# Patient Record
(demographics unavailable — no encounter records)

---

## 2018-02-13 NOTE — MRI
MRI BRAIN WITHOUT IV CONTRAST 

2/13/18

 

HISTORY: 

Left sided numbness and slurred speech with onset 2310 hours. Altered mental status. Numbness and tin
gling. Symptoms have now resolved.

 

COMPARISON:  

11/17/14.

 

FINDINGS:  

There is an area of encephalomalacia and volume loss involving the medial aspect of the right cerebra
l peduncle related to remote area of infarction. There are scattered punctate patchy areas of increas
ed FLAIR and T2 weighted signal intensity in the periventricular white matter which are nonspecific b
ut likely reflective of chronic small vessel ischemic changes. Similar findings seen in the brenda. Fin
dings were also present on the prior exam. There is no evidence of an acute infarction on today's exa
mination. There is diffuse cerebral volume loss. Ventricular system is normal in size, shape and posi
tion for the degree of sulcal atrophy. Appropriate flow voids are demonstrated at the base of the bra
in. 

 

The native lens on the left is not seen. However, the orbits, paranasal sinuses, and skull base are o
therwise within normal limits. There has been no other interval change compared to the prior exam. 

 

IMPRESSION:  

1.      No acute intracranial abnormalities demonstrated. 

2.      Chronic small vessel ischemic changes and cerebral volume loss. 

3.      Remote infarction involving the right cerebellar peduncle. This represented an acute infarcti
on on the prior study in 2014. 

 

POS: [facility]

## 2018-02-13 NOTE — HP
DATE OF ADMISSION:  02/13/2018

 

PRIMARY CARE DOCTOR:  Sunny Gao M.D.

 

PRIMARY CARDIOLOGIST:  Cecil Lyons M.D.

 

CHIEF COMPLAINT:  Stroke-like symptoms.

 

HISTORY OF PRESENT ILLNESS:  Patient is an 82-year-old female with hypertension, history of CVA with 
residual left-sided weakness and percutaneous PFO closure with cribriform plate in the past, presente
d to the emergency room with stroke like symptoms.

 

Around 10:30 p.m., patient noticed sudden onset of left-sided numbness along with slurring of speech.
  The family also noticed some left-sided facial droop.  Her mentation was not significantly altered 
per family.  There was no weakness on the right side reported.  No significant change in the weakness
 on the left side compared to her baseline.  There was no seizure, headache, double vision different 
from baseline reported.  No chest pain or palpitations.  She is compliant with 81 mg aspirin.  Her NI
H in the emergency room was 1.  She received aspirin in the emergency room.

 

PAST MEDICAL HISTORY:

1.  History of cerebrovascular accident with residual left-sided deficits.

2.  Percutaneous PFO closure with cribriform plate.

3.  Hypertension.

4.  Parkinson's disease.

5.  Depression.

6.  History of atrial fibrillation.

7.  History of compression fracture.

8.  Ascending aortic aneurysm.

9.  Hyperlipidemia.

10.  History of lupus.

11.  Senile tremors.

 

PAST SURGICAL HISTORY:

1.  PFO closure.

2.  Hysterectomy.

3.  Subtotal thyroidectomy.

4.  Surgery for left femoral neck fracture.

 

ALLERGIES:  Patient is allergic to LEVAQUIN.

 

CURRENT HOME MEDICATIONS:  Family to get accurate list of medications.

 

SOCIAL HISTORY:  Patient currently lives at home with her daughter.  No smoking, alcohol or drug use.
  She is FULL CODE.  Makes her own decision with the help of her family.  Ambulates with help of a wa
lker.

 

FAMILY HISTORY:  Negative for premature coronary artery disease.

 

REVIEW OF SYSTEMS:  Cannot be reliably obtained from the patient.  Patient is a poor historian.

 

PHYSICAL EXAMINATION:

VITAL SIGNS:  On ER arrival, temperature 98.2, respirations 20, pulse 61, blood pressure 179/116 with
 O2 saturation 98% on room air.

GENERAL:  An 82-year-old female in no apparent distress, still complaining of left-sided numbness.

HEENT:  Head is atraumatic, normocephalic.  Sclerae are anicteric.  Moist mucous membrane, no oral le
ean.  Pupils on the left was around 4-5 mm with good response to light.  The right eye is chronicall
y deviated outside.  The pupils were around 6-7 mm with sluggish response to light.  Patient cuco lay has to shield this eye to prevent double vision.

NECK:  Supple, no JVD appreciated.  No carotid bruits.

LUNGS:  Clear to auscultation bilaterally.  No wheezing or rales.

HEART:  S1 and S2 present.  Regular rate and rhythm.  No murmur, rubs, or gallops appreciated.  No he
aves or pulsation.

ABDOMEN:  Soft, nontender, bowel sounds present.

EXTREMITIES:  No edema or calf tenderness.

NEUROLOGIC:  Cranial nerves II-XII was normal on examination except for discussed above.  Power was 3
/5 in the left lower extremity which is unchanged from her baseline.  The power was 5/5 in right uppe
r and right lower extremity.  There was diminished sensation on the left half of the face.  She had d
iminished touch sensation on the left upper and left lower extremities.

PSYCHIATRY:  The patient is alert and awake, poor historian.

SKIN:  Warm and dry.

LYMPH NODES:  No palpable lymph nodes in the neck.

PERIPHERAL VASCULAR:  Radial pulses palpable bilaterally.

MUSCULOSKELETAL:  No joint swelling or tenderness.

 

LABORATORY DATA AND IMAGING DATA:  CBC showed WBC 7.3 with hemoglobin 15, hematocrit 44.9, platelet c
ount of 157.  PT, INR, PTT normal range.  Chemistries showed sodium 137, potassium 4.6, chloride 101,
 bicarbonate 26, BUN 20, creatinine 0.71, glucose of 92.  Cardiac enzymes were normal.  Urinalysis wa
s negative for acute findings.  Chest x-ray by my review was negative for infiltrate.  CT scan of the
 brain by my review was negative for acute CVA.  EKG by my review showed sinus bradycardia with first
 degree AV block.

 

IMPRESSION:

1.  Transient ischemic attack, suspected acute cerebrovascular accident.

2.  History of cerebrovascular accident with residual right-sided deficits, on aspirin.

3.  History of atrial fibrillation, currently in sinus rhythm.

4.  Parkinson's disease.

5.  Hypertension.

6.  Depression.

7.  Hyperlipidemia.

8.  History of patent foramen ovale closure with cribriform plate.

9.  History of compression fracture.

10.  Ascending aortic aneurysm.

11.  Senile tremors.

 

PLAN:  The patient will be monitored in the stroke unit.  We will change aspirin to 325 mg daily.  We
 will consult Neurology as well as Cardiology due to history of atrial fibrillation and PFO closure. 
 We will confirm home medications and start accordingly.  We will consult physical therapy, occupatio
nal therapy, and speech therapy.  NIH per protocol.  We will check fasting lipid profile.  Urine cult
ures have been sent.  She passed dysphagia screen in the emergency room.  Stroke education.  SCDs for
 DVT prophylaxis.  Statins.

 

Plan of care was discussed with the patient and the family at the bedside.  They stated understanding
.

## 2018-02-13 NOTE — CON
DATE OF CONSULTATION:  03/11/2017

 

CONSULTING PHYSICIAN:  [facility].

 

IMPRESSION:

1.  Possible transient small vessel TIA with left-sided tingling.

2.  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

 

PLAN:

1.  MRI of the brain is pending to determine whether new ischemic event occurred.

2.  Consider adding a statin if she comes back positive for new event.

 

HISTORY OF PRESENT ILLNESS:  Ms. De Los Santos is an elderly lady with a past history of a brainstem st
roke, hypertension.  She has some residual left upper extremity spastic weakness.  She began having s
ome tingling on the left side of the body.  She told her caregiver about it.  Her daughter came over 
and subsequently brought her to the hospital.  She had a CT scan in the emergency room, which did not
 show any acute hemorrhage.  She is in normal sinus rhythm.  Her lab work was unremarkable.  Echocard
iogram showed a normal ejection fraction.  Her symptoms have since resolved.  She is having a little 
bit of difficulty determining the duration of her symptoms.

 

PAST MEDICAL HISTORY:  As noted above.

 

ALLERGIES:  LEVAQUIN.

 

SOCIAL HISTORY:  No tobacco or alcohol use.

 

FAMILY HISTORY:  Noncontributory.

 

REVIEW OF SYSTEMS:  Positive for double vision, no complaints of headache, nausea, vomiting, vertigo,
 chest pain.

 

PHYSICAL EXAMINATION:

GENERAL:  She is a well-nourished elderly lady, in no distress.

VITAL SIGNS:  Stable.  She has been afebrile.

HEENT:  Conjunctivae are clear.  Her right eye sticks in a lateralized position.  The left eye respon
ds appropriately.  There is some subtle facial asymmetry present.  Oropharynx is clear.

NECK:  Supple.

EXTREMITIES:  No edema.

NEUROLOGIC:  She is alert and cooperative.  Her speech is fluent and clear.  Cranial nerves were inta
ct other than what noted above.  Motor exam showed spastic weakness in the left upper extremity with 
proximal antigravity strength.  Sensation is intact to touch.  Reflexes were upgoing on the left and 
downgoing on the right.  Gait was not tested, but reportedly she can walk with a rolling walker.  No 
abnormal movements were seen.

 

SUMMARY:  This is an elderly lady with some risk factors for small vessel ischemic event.  Her sympto
ms would suggest a possible thalamic infarction.  MRI is pending and shows evidence of ischemia, go a
head and start her on a statin.

## 2018-02-13 NOTE — RAD
PORTABLE CHEST ONE VIEW:

 

Date: 2-13-18 

Time: 1:17 a.m.

 

History: Altered mental status, slurred speech, numbness on the left side. 

 

FINDINGS: 

Comparison is made with exam of 12-10-17. 

 

The heart size is enlarged. The aorta is tortuous. The lungs are expanded without focal areas of cons
olidation, pneumothorax, karl pleural edema, or pleural effusions. 

 

IMPRESSION: 

No acute process. 

 

POS: [facility]

## 2018-02-13 NOTE — CT
PRELIMINARY REPORT/[facility]/EMERGENCY AFTER

HOURS PROCEDURE:

 

Addendum created by Neal Cooley MD on 2/13/2018 12:47 AM Central Time ([place] & [place])

THIS REPORT CONTAINS FINDINGS THAT MAY BE CRITICAL TO PATIENT CARE. The findings were verbally commun
icated via telephone conference with CHANTEL ALEXANDER at 12:47 AM CST on 2/13/2018. The findings we
re acknowledged and understood.

 

Initial Report created on 2/13/2018 12:39 AM Central Time ([place] & [place])

 

EXAM:

CT Head Without Intravenous Contrast

 

CLINICAL HISTORY:

82 years old, female; Signs and symptoms; Altered mental status/memory loss and speech disturbance an
d weakness, extremity; Slurred speech; Patient HX: Stroke alert. . . . F82, AMS, assisted living, str
lisa symptoms of left sided numbness and slurred speech onset 2310

 

TECHNIQUE:

Axial computed tomography images of the head/brain without intravenous contrast.

 

COMPARISON:

No relevant prior studies available.

 

FINDINGS:

Brain: Moderate volume loss. Chronic right basal ganglia lacunar infarctions

No hemorrhage. Mild white matter disease. No edema.

Ventricles: Unremarkable. No ventriculomegaly.

Bones/joints: Unremarkable. No acute fracture.

Soft tissues: Unremarkable.

Sinuses: Unremarkable as visualized. No acute sinusitis.

Mastoid air cells: Unremarkable as visualized. No mastoid effusion.

 

IMPRESSION:

No intracranial hemorrhage.Please see discussion above.

 

Thank you for allowing us to participate in the care of your patient.

 

Dictated and Authenticated by: Neal Cooley MD

02/13/2018 12:39 AM Central Time ([place] & [place])

 

 

 

                           FINAL REPORT

 

CT BRAIN WITHOUT CONTRAST:

 

I agree with the preliminary report given by Dr. Neal Cooley of [facility]. 

 

POS: [facility]

## 2018-02-14 NOTE — CON
DATE OF CONSULTATION:  02/13/2018

 

REASON FOR CONSULTATION:  Possible stroke or TIA.

 

HISTORY OF PRESENT ILLNESS:  Ms. De Los Santos is a very pleasant 82-year-old black female, very well k
nown to myself, who comes in for evaluation of possible TIA.  She was at home and the daughter who is
 very involved in her care, tells me that she had been getting confused in the last few days.  She ha
s a history of previous stroke that has caused her to have a facial droop and weakness on one side of
 her body.  Because of the confusion, EMS was called and EMS saw her with slurred speech and left-roly
ed numbness and left-sided facial droop, so she was brought in for further evaluation.  Currently, kimberly portillo is still a little bit confused, but much closer to normal.  Her neurological examination is similar
 to what it was before as far as strength is concerned.

 

PAST MEDICAL HISTORY:

1.  History of cerebrovascular accident with residual left-sided deficits.

2.  History of PFO closure with a cribriform Amplatzer occluder device.

3.  Hypertension.

4.  Parkinson disease.

5.  Depression.

6.  Paroxysmal atrial fibrillation.

7.  Compression fractures in the past.

8.  Descending aortic aneurysm.

9.  Hyperlipidemia.

10.  Lupus.

11.  Senile tremors.

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

 

PAST SURGICAL HISTORY:

1.  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

2.  Hysterectomy.

3.  Subtotal thyroidectomy.

3.  Left femoral neck fracture repair.

 

ALLERGIES:  LEVAQUIN.

 

FAMILY HISTORY:  Noncontributory.

 

SOCIAL HISTORY:  No alcohol, tobacco, or drugs.

 

OUTPATIENT MEDICATIONS:  Include,

1.  Lasix 20 mg p.r.n.

2.  Sinemet.

3.  Aspirin 81 a day.

4.  Pristiq.

5.  Aricept.

6.  Docusate.

7.  Hydrochlorothiazide 12.5 mg daily.

8.  Lactobacillus.

9.  Primidone.

10.  Lisinopril 2.5 mg a day.

 

REVIEW OF SYSTEMS:  It is difficult to obtain, as the patient is somewhat confused.

 

PHYSICAL EXAMINATION:

VITAL SIGNS:  Temperature 97.6, pulse 98, respiration rate 18, satting 97% on room air, blood pressur
e 137/100.

GENERAL:  Awake, alert.  She is oriented to person, difficult to place and time, in no distress.

HEENT:  Normocephalic, atraumatic.

NECK:  Supple.

LUNGS:  Clear.

CARDIOVASCULAR:  S1, S2.  No S3 or S4.

ABDOMEN:  Soft, positive bowel sounds.

EXTREMITIES:  No edema.

SKIN:  Warm and dry.

 

LABORATORY WORK:  Reviewed.  CBC is unremarkable.  Coags were normal.  Chemistry is very normal.  Tro
ponins were negative x3.  UA was unremarkable.

 

ASSESSMENT AND PLAN:

1.  Possible transient ischemic attack.  We will await Neurology consultation to see what their thoug
hts as well as results of MRI.

2.  Echocardiogram did show no evidence of shunting in her heart.  Her cribriform plate seems to be w
ell seated without any issues.  If this were to be a transient ischemic attack or stroke, she would b
e a candidate for a left atrial appendage occluder device most likely in the form of a Lariat device.
  Given her PFO closure device in place, Watchman would not be possible.  We will decide pending resu
lts of MRI and consultation with Neurology.

 

Thank you for letting us participate in the care of your patient.  We will follow.

## 2018-02-14 NOTE — PDOC.CTH
Cardiology Progress Note





- Subjective





She is still a little more confused than normal. Her MRI did not show an acute 

stroke. 





- Objective


 Vital Signs











  Temp Pulse Resp BP BP Pulse Ox


 


 02/14/18 16:00  97.8 F  82  18   114/68  97


 


 02/14/18 12:00  98.4 F  83  14   119/72  96


 


 02/14/18 09:28   104 H   141/75 H  


 


 02/14/18 09:26  97.7 F  104 H  14  141/75 H   95


 


 02/14/18 08:00  97.7 F  104 H  16   








 











Weight                         188 lb 14.4 oz














 











 02/13/18 02/14/18 02/15/18





 06:59 06:59 06:59


 


Intake Total  720 


 


Output Total   300


 


Balance  720 -300














- Physical Examination


General/Neuro: NAD


Neck: no JVD present


Lungs: unlabored respirations


Heart: other: (Irreg)


Abdomen: NT/ND


Extremities: + edema B (none)





- Labs


Result Diagrams: 


 02/13/18 00:49





 02/14/18 14:18


 Troponin/CKMB











CK-MB (CK-2)  1.5 ng/mL (0-6.6)   02/13/18  00:49    


 


Troponin I  0.013 ng/mL (< 0.028)   02/13/18  06:52    














- Assessment/Plan





1. AMS


2. Hx of CVA


3. s/p Amplatzer occluder to the interatrial septum. Normal functioning. 





PLAN:


- No changes for now. 


- Statin for Hx of CVA


- No anticoagulation due to high risk of falls.

## 2018-02-15 NOTE — DIS
DATE OF ADMISSION:  02/13/2018

 

DATE OF DISCHARGE:  02/14/2018

 

DISCHARGE DIAGNOSES:  Possible transient ischemic attack, unspecified.

 

DISCHARGE SUMMARY:  The patient is a very pleasant 82-year-old female with history of CVA with left-s
ided residual deficits, percutaneous PFO with closure with cribriform plate, hypertension, Parkinson'
s, depression who presented initially to the hospital with worsening left-sided weakness and possible
 slurred speech.  The patient initially underwent a CT of the head which was negative.  She also unde
rwent an MRI of the brain which did not show any acute process.  The patient was seen by Cardiology a
nd Neurology without any change in her medications, only a statin was added and patient was discharge
d back to her Nursing Care Facility.  According to family, the patient was back at her baseline.

 

DISCHARGE MEDICATIONS:  She will continue her Lasix 20 mg as needed, carbidopa/levodopa 1 p.o. t.i.d.
, aspirin 81 mg daily, donepezil 10 mg daily, stool softener, Colace 100 mg at bedtime, hydrochloroth
iazide 12.5 mg daily, lisinopril 2.5 mg daily, Atorvastatin 10 mg p.o. daily.  Lactobacillus 1 p.o. b
.i.d. and Pristiq 200 mg p.o. daily.

 

PHYSICAL EXAMINATION:

GENERAL:  Patient was awake, alert, and oriented x3.

CARDIOVASCULAR:  S1, S2 present, no murmurs, rubs or gallop.  No irregularity was noted on the cardia
c monitor.

LUNGS:  Clear to auscultation.  No rhonchi, wheezes noted.

ABDOMEN:  Soft, nontender.  Bowel sounds are present x2.

 

DISCHARGE INSTRUCTIONS:  I spoke with the patient's friend who was at the bedside.  I also spoke with
 Neurology who said it was okay to discharge the patient home.  Cardiology had already seen the patie
nt and recommended to follow up as an outpatient.

## 2018-03-10 NOTE — EKG
Test Reason : 

Blood Pressure : ***/*** mmHG

Vent. Rate : 058 BPM     Atrial Rate : 058 BPM

   P-R Int : 216 ms          QRS Dur : 086 ms

    QT Int : 404 ms       P-R-T Axes : 029 -17 002 degrees

   QTc Int : 396 ms

 

Sinus bradycardia with 1st degree A-V block

Low voltage QRS

Cannot rule out Anterior infarct , age undetermined

Abnormal ECG

 

Confirmed by CHANTEL ALEXANDER ([id number]),  CHANTEL GARCIA ([id number]) on 3/10/2018 6:22:08 PM

 

Referred By:             Confirmed By:CHANTEL ALEXANDER

## 2018-04-08 NOTE — RAD
CHEST 1 VIEW:

 

Date:  04/08/18 

 

HISTORY:  

Fall. Chest injury. 

 

COMPARISON:  

02/13/18. 

 

FINDINGS:

Cardiac silhouette is magnified and upper limits of normal in size. Pulmonary vasculature is unremark
able. Mediastinum is midline. There is no confluent air space consolidation or evidence of pneumothor
ax. Small, subtle disc-shaped metallic density overlying the right heart is stable.

 

IMPRESSION: 

No active cardiopulmonary abnormalities are demonstrated. 

 

 

POS: [facility]

## 2018-04-08 NOTE — RAD
LEFT FEMUR 2 VIEWS:

 

Date:  04/08/18 

 

HISTORY:  

Fall. Left leg injury. 

 

FINDINGS:

Left hip prosthesis is in place without perihardware lucency. No acute fracture, dislocation, or aggr
essive osseous erosions. Osseous structures are demineralized. Irregular sclerosis at the distal femu
r has the appearance of an old infarct. 

 

IMPRESSION: 

1.  Left hip prosthesis. No acute osseous abnormalities are demonstrated. 

2.  Osteoporosis. 

 

 

POS: [facility]

## 2018-04-08 NOTE — CT
PRELIMINARY REPORT/[facility]/EMERGENCY AFTER

HOURS PROCEDURE: 

 

 

EXAM:

CT Head Without Intravenous Contrast

 

CLINICAL HISTORY:

82 years old, female; Injury or trauma; Fall; Initial encounter; Abrasion; Not specified; Patient HX:
 F82 presents to ed C/O fall. Family notes pt's fall was slow and well controlled as she collapsed on
to her knees with family present. She was unable to get up with family assistance and was left on chase
or with her neck turned until ems arrival. Fall was mechanical as pt was wearing new shoes. Complains
 of l sided neck pain. Denies hitting head, loc, hip pain. HX CVA (x 2 yrs ago with l sided deficits)
.

 

TECHNIQUE:

Axial computed tomography images of the head/brain without intravenous contrast.

 

COMPARISON:

No relevant prior studies available.

 

FINDINGS:

Brain: Moderate volume loss. Chronic right thalamic lacunar infarction noted

No hemorrhage. Moderate white matter disease. No edema.

Ventricles: Unremarkable. No ventriculomegaly.

Bones/joints: Unremarkable. No acute fracture.

Soft tissues: Unremarkable.

Sinuses: Unremarkable as visualized. No acute sinusitis.

Mastoid air cells: Unremarkable as visualized. No mastoid effusion.

 

IMPRESSION:

No intracranial hemorrhage.Please see discussion above.

 

 

Thank you for allowing us to participate in the care of your patient.

Dictated and Authenticated by: Neal Cooley MD

04/08/2018 4:21 AM Central Time ([place] & [place])

 

 

 

FINAL REPORT 

CT HEAD NONCONTRAST PERFORMED ON AN EMERGENCY BASIS: 

 

Date:  04/08/18

Time:  0354 hours 

 

HISTORY:  

Fall. Head injury. 

 

FINDINGS:

Findings agree with the preliminary report by Evelyn. No acute traumatic injury is demonstrated. 

 

 

POS: [facility]

## 2018-04-08 NOTE — CT
PRELIMINARY REPORT/[facility]/EMERGENCY AFTER

HOURS PROCEDURE: 

 

EXAM:

CT Cervical Spine Without Intravenous Contrast

 

CLINICAL HISTORY:

82 years old, female; Injury or trauma; Fall; Initial encounter; Abrasion; Patient HX: F82 presents t
o ed C/O fall. Family notes pt's fall was slow and well controlled as she collapsed onto her knees wi
th family present. She was unable to get up with family assistance and was left on floor with her nec
k turned until ems arrival. Fall was mechanical as pt was wearing new shoes. Complains of l sided nec
k pain. Denies hitting head, loc, hip pain. HX CVA (x 2 yrs ago with l sided deficits).

 

TECHNIQUE:

Axial computed tomography images of the cervical spine without intravenous contrast.

 

COMPARISON:

No relevant prior studies available.

 

FINDINGS:

Vertebrae: Loss of vertebral body height, presumed degenerative No acute fracture.

Discs/spinal canal/neural foramina: No acute findings. No significant spinal canal stenosis. Mild dis
c bulging at C3-C4 noted

Soft tissues: Unremarkable.

Lung apices: Unremarkable as visualized.

 

IMPRESSION:

No definite acute cervical fracture observed

 

Thank you for allowing us to participate in the care of your patient.

Dictated and Authenticated by: Neal Cooley MD

04/08/2018 4:20 AM Central Time ([place] & [place])

 

FINAL REPORT 

CT CERVICAL SPINE NONCONTRAST PERFORMED ON AN EMERGENCY BASIS: 

 

Date:  04/08/18

Time:  0353 hours 

 

HISTORY:  

Fall. Neck injury. 

 

FINDINGS:

Findings agree with the preliminary report by Evelyn. Degenerative changes are as detailed in the [facility] 
report. No acute osseous abnormalities are demonstrated.

 

 

POS: [facility]

## 2018-04-08 NOTE — RAD
AP PELVIS 1 VIEW:

 

Date:  04/08/18 

 

HISTORY:  

Fall. Pelvic injury. 

 

COMPARISON:  

11/02/16. 

 

FINDINGS:

Stool partially obscures the sacrum. Sacral ala and pelvic rings are intact. Left hip prosthesis is p
artially visualized. Mild degenerative changes right hip. Osseous structures are demineralized. 

 

IMPRESSION: 

1.  Osteoporosis. Left hip prosthesis. 

2.  No acute osseous abnormalities are demonstrated. 

 

 

POS: [facility]

## 2018-04-10 NOTE — CT
ADDENDUM: 

 

There is a minimally displaced fracture of the medial aspect of the left clavicle.  

 

IMPRESSION: 

 

1.  No evidence of acute intrathoracic abnormality.

2.  Left clavicle fracture.

 

 

 

POS: [facility]

## 2018-04-11 NOTE — CT
CT CHEST WITH CONTRAST:

 

CT ABDOMEN AND PELVIS WITH CONTRAST:

 

LIMITED CT OF THORACIC AND LUMBOSACRAL SPINE WITH CONTRAST:

 

HISTORY:

The patient fell on her left side, from standing, while using a walker, at a nursing home.  The patie
nt complains of left-sided chest and abdomen pain, as well as left shoulder and hip pain, and back pa
in.

 

TECHNIQUE:

Multiple contiguous axial images were obtained in a CT of the chest with contrast.  Coronal reformats
 were performed.

 

Multiple contiguous axial images were obtained in a CT of the abdomen and pelvis with contrast.  Caroline
nal reformats were performed.

 

Limited CT of the thoracic and lumbosacral spine was performed.  Sagittal and coronal reformats were 
created based off images obtained in the chest, abdomen, and pelvis CTs.

 

FINDINGS:

CHEST:  No focal infiltrate or mass is seen in the lungs.  No pneumothorax or pleural effusion is see
n.

 

The heart is normal in size without focal cardiac abnormality.  No hilar or mediastinal lymphadenopat
hy is appreciated.

 

The chest wall soft tissues are unremarkable.  Degenerative changes are seen in the shoulders.  There
 is a mimimally displaced fracture of the left medial clavicle.  No rib fracture is appreciated.

 

ABDOMEN AND PELVIS:  There are hypodensities in the left kidney, measuring up to 3.7 cm in size, whic
h represent cysts.  The liver, gallbladder, right kidney, adrenal glands, spleen, and pancreas are un
remarkable.  No free air, free fluid, or stranding changes are seen in the abdomen or pelvis.

 

The patient is status post hysterectomy.  There are scattered diverticula in the colon.  The small lazaro
wel and appendix are unremarkable.  No abdominal or pelvic lymphadenopathy is seen.  Atherosclerotic 
calcifications are seen in the aorta.

 

The patient is status post left hip arthroplasty.  No acute fracture is seen in the bones of the pelv
is.  The abdominal wall soft tissues are unremarkable.

 

THORACIC AND LUMBOSACRAL SPINE:  There is wedge compression deformity of the T10 vertebral body.  Thi
s appears remote.  No obvious acute fracture is seen.  No subluxation is present.  There is also slig
ht wedging of the L4 vertebral body, which appears remote.

 

IMPRESSION:

1.  No evidence of acute intrathoracic abnormality.

2.  No evidence of acute intraabdominal/pelvic abnormality.

3.  Left renal cyst.

4.  Diverticulosis.

5.  Remote wedge compression fractures of T10 and L4 without acute spinal abnormality.

6.  Minimally displaced fracture of the left medial clavicle.

 

POS: [facility]

## 2018-05-25 NOTE — RAD
CHEST ONE VIEW:

 

History: Cough. 

 

Comparison: 4-8-18

 

FINDINGS: 

Cardiac silhouette magnified and enlarged. Pulmonary vasculature is accentuated by shallow inspiratio
n. Mediastinum is midline. Subtle parenchymal opacity projects over the right base, partially obscuri
ng the apex of the right hemidiaphragm. Patient is rotated rightward. There is calcification in the a
radha. Metallic device remains over the right cardiac silhouette. 

 

IMPRESSION: 

Subtle right basilar infiltrate. Clinical correlation regarding other signs and symptoms of right bas
ilar pneumonitis is required. Please consider close radiographic follow up after medical treatment to
 evaluate for clearing. 

 

POS: [facility]

## 2018-05-26 NOTE — PDOC.PN
- Subjective


Encounter Start Date: 05/26/18


Encounter Start Time: 10:10


Subjective: awake this morning, not in distress


-: not fully oriented





- Objective


Resuscitation Status: 


 











Resuscitation Status           DNR:Do Not Resuscitate














MAR Reviewed: Yes


Vital Signs & Weight: 


 Vital Signs (12 hours)











  Temp Pulse Resp BP BP Pulse Ox


 


 05/26/18 09:17   54 L    


 


 05/26/18 07:01  98.1 F  54 L  16  114/78   99


 


 05/26/18 04:00  98.2 F  62  20   115/71  98


 


 05/25/18 23:49  98.1 F  60  20   121/66  95








 Weight











Weight                         187 lb 4.8 oz














Result Diagrams: 


 05/26/18 04:18





 05/26/18 04:17





Phys Exam





- Physical Examination


HEENT: PERRLA, moist MMs


Neck: no JVD, supple


Respiratory: no wheezing, no rales


rhonchi+


Cardiovascular: RRR, no significant murmur


Gastrointestinal: soft, non-tender, positive bowel sounds


Musculoskeletal: pulses present, edema present


Neurological: non-focal, moves all 4 limbs





Dx/Plan


(1) Community acquired bacterial pneumonia


Code(s): J15.9 - UNSPECIFIED BACTERIAL PNEUMONIA   Status: Acute   





(2) H/O: CVA (cerebrovascular accident)


Code(s): Z86.73 - PRSNL HX OF TIA (TIA), AND CEREB INFRC W/O RESID DEFICITS   

Status: Chronic   Comment: with left hemiparesis   





(3) HTN (hypertension)


Code(s): I10 - ESSENTIAL (PRIMARY) HYPERTENSION   Status: Chronic   


Qualifiers: 


   Hypertension type: essential hypertension   Qualified Code(s): I10 - 

Essential (primary) hypertension   





(4) Dementia


Code(s): F03.90 - UNSPECIFIED DEMENTIA WITHOUT BEHAVIORAL DISTURBANCE   Status: 

Chronic   


Qualifiers: 


   Dementia type: unspecified type   Dementia behavioral disturbance: without 

behavioral disturbance   Qualified Code(s): F03.90 - Unspecified dementia 

without behavioral disturbance   





(5) Parkinson disease


Code(s): G20 - PARKINSON'S DISEASE   Status: Chronic   





- Plan


ceftriaxone, zithromax and nebs prn


-: gentle iv fluids, may dc if tolerating oral diet


-: is more awake this morning


-: nebs prn, home meds for parkinson's, dementia, oral iron


-: to mobilize as tolerated





* .








Review of Systems





- Medications/Allergies


Allergies/Adverse Reactions: 


 Allergies











Allergy/AdvReac Type Severity Reaction Status Date / Time


 


levofloxacin [From Levaquin] Allergy   Verified 05/25/18 10:55











Medications: 


 Current Medications





Acetaminophen (Tylenol)  650 mg PO Q4H PRN


   PRN Reason: Headache/Fever or Pain


   Last Admin: 05/26/18 03:34 Dose:  650 mg


Acidophilus (Floranex)  1 tab PO BID [facility]


   Last Admin: 05/26/18 10:51 Dose:  1 tab


Albuterol/Ipratropium (Duoneb)  3 ml NEB [id number] [facility]


Aspirin (Ecotrin)  81 mg PO DAILY [facility]


   Last Admin: 05/26/18 09:17 Dose:  81 mg


Benzonatate (Tessalon)  100 mg PO TID [facility]


   Last Admin: 05/26/18 09:18 Dose:  100 mg


Carbidopa/Levodopa (Sinemet Cr 50/200)  1 tab PO TID [facility]


   Last Admin: 05/26/18 10:52 Dose:  1 tab


Docusate Sodium (Colace)  100 mg PO BID [facility]


   Last Admin: 05/26/18 09:17 Dose:  100 mg


Donepezil HCl (Aricept)  10 mg PO DAILY [facility]


   Last Admin: 05/26/18 09:18 Dose:  10 mg


Enoxaparin Sodium (Lovenox)  40 mg SC 0900 [facility]


   Last Admin: 05/26/18 09:16 Dose:  40 mg


Famotidine (Pepcid)  20 mg PO BID [facility]


   Last Admin: 05/26/18 09:18 Dose:  20 mg


Ferrous Sulfate (Feosol)  325 mg PO HS [facility]


   Last Admin: 05/25/18 20:28 Dose:  325 mg


Guaifenesin (Mucinex)  600 mg PO Q12HR [facility]


   Last Admin: 05/26/18 09:18 Dose:  600 mg


Guaifenesin/Dextromethorphan (Robitussin Dm)  15 ml PO Q4H PRN


   PRN Reason: Cough


   Last Admin: 05/25/18 21:44 Dose:  15 ml


Azithromycin 500 mg/ Sodium (Chloride)  250 mls @ 250 mls/hr IVPB 0900 [facility]


   Last Admin: 05/26/18 09:44 Dose:  250 mls


Ceftriaxone Sodium 2 gm/ (Sodium Chloride)  100 mls @ 200 mls/hr IVPB 0800 [facility]


   Last Admin: 05/26/18 10:52 Dose:  100 mls


Sodium Chloride (Normal Saline 0.9%)  1,000 mls @ 60 mls/hr IV .[id number] [facility]


   Last Admin: 05/26/18 03:41 Dose:  1,000 mls


Lisinopril (Zestril)  2.5 mg PO DAILY [facility]


   Last Admin: 05/26/18 09:17 Dose:  2.5 mg


Pristiq 100mg Tablet  0 each PO QAM [facility]


   Last Admin: 05/26/18 09:44 Dose:  1 each


Primidone (Mysoline)  50 mg PO HS [facility]


   Last Admin: 05/25/18 20:27 Dose:  50 mg


Sodium Chloride (Flush - Normal Saline)  10 ml IVF Q12HR [facility]


Sodium Chloride (Flush - Normal Saline)  10 ml IVF PRN PRN


   PRN Reason: Saline Flush

## 2018-05-27 NOTE — PDOC.PN
- Subjective


Encounter Start Date: 05/27/18


Encounter Start Time: 09:30


Subjective: breathing better, is amb in her special parkinson walker in room


-: daughter at bedside





- Objective


Resuscitation Status: 


 











Resuscitation Status           DNR:Do Not Resuscitate














MAR Reviewed: Yes


Vital Signs & Weight: 


 Vital Signs (12 hours)











  Temp Pulse Resp BP BP BP Pulse Ox


 


 05/27/18 08:48   76   127/85   


 


 05/27/18 08:00  97.9 F  76  16   127/85   98


 


 05/27/18 06:09   71  14     95


 


 05/27/18 04:00  97.8 F  84  20    126/82  96


 


 05/27/18 00:20   65  12    


 


 05/26/18 23:47  98.5 F  63  20    144/67 H  100








 Weight











Weight                         187 lb 4.8 oz














I&O: 


 











 05/26/18 05/27/18 05/28/18





 06:59 06:59 06:59


 


Intake Total  1086 


 


Balance  1086 











Result Diagrams: 


 05/26/18 04:18





 05/27/18 08:07





Phys Exam





- Physical Examination


HEENT: PERRLA, moist MMs


Neck: no JVD, supple


Respiratory: no wheezing, no rales


rhonchi+


Cardiovascular: RRR, no significant murmur


Gastrointestinal: soft, non-tender, positive bowel sounds


Musculoskeletal: no edema, pulses present


Neurological: non-focal, moves all 4 limbs


Psychiatric: A&O x 3





Dx/Plan


(1) Community acquired bacterial pneumonia


Code(s): J15.9 - UNSPECIFIED BACTERIAL PNEUMONIA   Status: Acute   





(2) H/O: CVA (cerebrovascular accident)


Code(s): Z86.73 - PRSNL HX OF TIA (TIA), AND CEREB INFRC W/O RESID DEFICITS   

Status: Chronic   Comment: with left hemiparesis   





(3) HTN (hypertension)


Code(s): I10 - ESSENTIAL (PRIMARY) HYPERTENSION   Status: Chronic   


Qualifiers: 


   Hypertension type: essential hypertension   Qualified Code(s): I10 - 

Essential (primary) hypertension   





(4) Dementia


Code(s): F03.90 - UNSPECIFIED DEMENTIA WITHOUT BEHAVIORAL DISTURBANCE   Status: 

Chronic   


Qualifiers: 


   Dementia type: unspecified type   Dementia behavioral disturbance: without 

behavioral disturbance   Qualified Code(s): F03.90 - Unspecified dementia 

without behavioral disturbance   





(5) Parkinson disease


Code(s): G20 - PARKINSON'S DISEASE   Status: Chronic   





- Plan


is on ceftriaxone and zithromax


-: to switch to omnicef for 6 days in am


-: will need alb nebs to be faxed to pharmacy in am (has nebulizer at home)


-: d/w daughter at bedside


-: on sinemet and primidone.





* .


[facility] as [facility] regular pharmacy 

[facility].





Review of Systems





- Medications/Allergies


Allergies/Adverse Reactions: 


 Allergies











Allergy/AdvReac Type Severity Reaction Status Date / Time


 


levofloxacin [From Levaquin] Allergy   Verified 05/25/18 10:55











Medications: 


 Current Medications





Acetaminophen (Tylenol)  650 mg PO Q4H PRN


   PRN Reason: Headache/Fever or Pain


   Last Admin: 05/27/18 04:30 Dose:  650 mg


Acidophilus (Floranex)  1 tab PO BID [facility]


   Last Admin: 05/27/18 08:49 Dose:  1 tab


Albuterol/Ipratropium (Duoneb)  3 ml NEB [id number] [facility]


   Last Admin: 05/27/18 06:09 Dose:  3 ml


Aspirin (Ecotrin)  81 mg PO DAILY [facility]


   Last Admin: 05/27/18 08:48 Dose:  81 mg


Benzonatate (Tessalon)  100 mg PO TID [facility]


   Last Admin: 05/27/18 08:48 Dose:  100 mg


Carbidopa/Levodopa (Sinemet Cr 50/200)  1 tab PO TID [facility]


   Last Admin: 05/27/18 08:49 Dose:  1 tab


Docusate Sodium (Colace)  100 mg PO BID [facility]


   Last Admin: 05/27/18 08:48 Dose:  100 mg


Donepezil HCl (Aricept)  10 mg PO DAILY [facility]


   Last Admin: 05/27/18 08:47 Dose:  10 mg


Enoxaparin Sodium (Lovenox)  40 mg SC 0900 [facility]


   Last Admin: 05/27/18 08:49 Dose:  40 mg


Famotidine (Pepcid)  20 mg PO BID [facility]


   Last Admin: 05/27/18 08:47 Dose:  20 mg


Ferrous Sulfate (Feosol)  325 mg PO HS [facility]


   Last Admin: 05/26/18 20:24 Dose:  325 mg


Guaifenesin (Mucinex)  600 mg PO Q12HR [facility]


   Last Admin: 05/27/18 08:49 Dose:  600 mg


Guaifenesin/Dextromethorphan (Robitussin Dm)  15 ml PO Q4H PRN


   PRN Reason: Cough


   Last Admin: 05/26/18 20:23 Dose:  15 ml


Azithromycin 500 mg/ Sodium (Chloride)  250 mls @ 250 mls/hr IVPB 0900 [facility]


   Last Admin: 05/27/18 10:10 Dose:  250 mls


Ceftriaxone Sodium 2 gm/ (Sodium Chloride)  100 mls @ 200 mls/hr IVPB 0800 [facility]


   Last Admin: 05/27/18 08:46 Dose:  100 mls


Lisinopril (Zestril)  2.5 mg PO DAILY [facility]


   Last Admin: 05/27/18 08:48 Dose:  2.5 mg


Pristiq 100mg Tablet  0 each PO QAM [facility]


   Last Admin: 05/27/18 08:50 Dose:  1 each


Potassium Chloride (K-Dur)  40 meq PO Q6H [facility]


   Stop: 05/27/18 17:16


Primidone (Mysoline)  50 mg PO HS [facility]


   Last Admin: 05/26/18 20:23 Dose:  50 mg


Sodium Chloride (Flush - Normal Saline)  10 ml IVF Q12HR [facility]


   Last Admin: 05/27/18 08:51 Dose:  Not Given


Sodium Chloride (Flush - Normal Saline)  10 ml IVF PRN PRN


   PRN Reason: Saline Flush

## 2018-05-28 NOTE — DIS
DATE OF ADMISSION:  05/25/2018

 

DATE OF DISCHARGE:  05/28/2018

 

ADMITTING DIAGNOSES:  Pneumonia, history of hypertension, Parkinson's disease, dementia, cerebrovascu
lar accident, osteoarthritis, lupus in remission, hysterectomy.

 

DISCHARGE DIAGNOSES:  Pneumonia, stable and resolving; history of hypertension; Parkinson's disease; 
dementia; cerebrovascular accident; OA; hysterectomy; and lupus in remission.

 

HOSPITAL COURSE:  This is an 82-year-old female who was brought in by her daughter because of recurre
nt fevers and cough.  The patient was found to have pneumonia, admitted to the Internal Medicine team
 for further management and care and treatment.  The patient was found to have subtle right basilar i
nfiltrations with an elevated temperature.  At the time of admission, the patient also was found to h
ave low potassium, low sodium levels.  Patient was admitted to Internal Medicine team, started on IV 
antibiotics and had improvement after 3 days of stay in the hospital.  Patient at the point of discha
rge was afebrile, white blood cell count back to normal.  She did not have any nausea, vomiting, diar
comfort, constipation, chest pain, fevers, chills, or shortness of breath.  She was ready to go back ragini
e and stated that she was back to her baseline, daughter at bedside as well.  The patient was to be d
ischarged home and follow up with PCP in 7-10 days.

 

DISPOSITION:  Home.

 

FOLLOWUP:  With PCP in 7-10 days.

 

ACTIVITY:  As tolerated.

 

CONDITION:  Stable.

 

PROGNOSIS:  Guarded.

 

DIET:  Low fat, low calorie, high fiber diet.

 

Case and plan discussed with the patient and family at length.  They understand and agree with this p
bill.

## 2018-11-17 NOTE — RAD
LEFT ANKLE THREE VIEWS:

11/17/18

 

COMPARISON:  

4/21/15.

 

HISTORY: 

Injury, trauma, pain. 

 

FINDINGS:  

The bones are demineralized. There is soft tissue swelling along the  dorsal aspect of the talar neck
. There is mild irregularity of the talar neck which may signify subtle avulsion fracture. No displac
ed fracture or evidence of dislocation is seen. 

 

The fifth metatarsal is ill-defined proximally and distally. This could be on the basis of technical 
factors and/or osteopenia, but if there are symptoms referable to the lateral aspect of the foot/fift
h metatarsal, dedicated imaging of the foot is advised. These findings are most significant in the re
gion of the distal fifth metatarsal where the cortex is quite ill-defined. 

 

IMPRESSION:  

1.      Dorsal soft tissue swelling overlying the talar neck with a possible nondisplaced avulsion fr
acture. 

2.      Abnormal appearance of the fifth metatarsal. Please consider dedicated left foot imaging. 

 

POS: [facility]

## 2018-11-17 NOTE — RAD
LEFT HIP TWO VIEWS:

11/17/18

 

COMPARISON:  

11/1/16.

 

HISTORY: 

Injury, trauma, pain.

 

FINDINGS:  

A left total hip arthroplasty is present. There is no evidence for hardware failure. No acute fractur
e or dislocation noted. 

 

IMPRESSION:  

No acute fracture or dislocation is seen. 

 

 

 

POS: [facility]

## 2018-11-17 NOTE — RAD
TWO VIEWS LEFT TIBIA AND FIBULA:

11/17/18

 

HISTORY: 

Injury, trauma, pain. 

 

FINDINGS:  

There is a subacute/chronic fracture involving the proximal left fibula shaft with significant adjace
nt callus formation suggesting healing. 

 

There is incompletely imaged sclerotic lesion within the distal left femur/medial femoral condyle, wh
ich could represent a sclerotic lesion or bone infarction. No acute displaced fracture or evidence of
 dislocation seen. The bones are demineralized. 

 

IMPRESSION:  

Subacute/chronic findings as described above. 

 

POS: [facility]IO

## 2018-12-22 NOTE — CT
CT ABDOMEN AND PELVIS:

 

Date:  12/22/18 

 

HISTORY:  

Pain, dysuria. Urinary tract infection. 

 

TECHNIQUE:  

Axial CT imaging is obtained at 5 mm intervals from the lung bases through the pubic symphysis withou
t contrast. Coronal reformatted imaging obtained. 

 

FINDINGS:

 

Lack of contrast media limits assessment of the viscera, bowel, vascular structures, and for lymphade
nopathy. 

 

Imaged lung bases are grossly unremarkable. 

 

A metallic density is again seen along the region of the junction of the right atrium and right ventr
icle which may represent a mechanical valve. No free intraperitoneal air or fluid. There is a left to
balwinder hip arthroplasty. 

 

Low density lesion in left hepatic lobe on image 16 measuring 8.0 mm noted, too small to characterize
. Probable cholelithiasis and/or milk of calcium noted within the gallbladder fundus on image 30. The
 spleen, pancreas, and adrenal glands demonstrate no acute findings. 

 

Numerous left-sided renal cysts are noted. No nephrolithiasis or evidence of obstructive uropathy is 
seen on either side. 

 

There is a Tamayo catheter within a decompressed urinary bladder. Streak artifact from the left hip ar
throplasty limits detailed assessment of the pelvis. The rectum is filled and expanded with stool. Si
gnificant stool seen within the sigmoid colon. There is diverticulosis of the sigmoid colon without e
vidence for diverticulitis. 

 

There is an adnexal/ovarian cyst measuring 2.0 cm on the left, best seen on image 59, for which follo
w-up pelvic ultrasound is suggested given patient age. 

 

No evidence for bowel obstruction or appendicitis. Scattered atherosclerotic calcification of abdomin
al aorta and its branches noted. 

 

Review of the osseous structures demonstrates diffuse osteopenia. There is multilevel lower lumbar sp
ine facet hypertrophic change. A prior study dated 04/10/18 demonstrated fracture deformities of T10 
and L4, stable. 

 

IMPRESSION: 

1.  No evidence for nephrolithiasis or obstructive uropathy. 

 

2.  Diverticulosis without evidence for diverticulitis. The rectum is expanded and filled with stool,
 which may signify a degree of fecal impaction. 

 

3.  Findings suggesting a 2.0 cm ovarian/adnexal cyst on the left. Nonemergent follow-up pelvic ultra
sound advised given patient's age. 

 

 

 

POS: GREG[facility]IO

## 2019-04-28 NOTE — RAD
XR Chest 1 View Portable



HISTORY:Productive cough.



COMPARISON: 5/25/2018 study.



FINDINGS: Heart size is enlarged. There are atherosclerotic changes of the aorta. The lungs are clear
 of infiltrates. There are arthritic changes of the spine.



IMPRESSION: Mild cardiomegaly. No acute changes.



Reported By: Gilberto Silva 

Electronically Signed:  4/28/2019 12:15 PM

## 2019-12-02 NOTE — RAD
XR Shoulder Lt 3 View STANDARD



HISTORY: Injury, left shoulder pain



FINDINGS:

There is a nondisplaced fracture involving the distal/lateral aspect of the left clavicle.



Reported By: Ambrose Ritter 

Electronically Signed:  12/2/2019 3:32 PM

## 2020-09-04 NOTE — MRI
MRI BRAIN WITH AND WITHOUT IV CONTRAST:



HISTORY: Cerebrovascular accident, unspecified. TIA



COMPARISON: 5/14/2010



FINDINGS:



No restricted diffusion is seen. No evidence of acute infarct, hemorrhage, mass, midline shift or abn
ormal extra-axial fluid collections is noted. No abnormal postcontrast enhancement is seen. The

ventricular size is appropriate and the basilar cisterns are patent.



There are multiple foci of T2 prolongation in the periventricular white matter, consistent with chron
ic small vessel ischemic disease. There are changes of cortical atrophy. There are small areas of

gliosis in the right posterior frontal and parietal lobes.



The 6 mm T1 hyperintense lesion in the left posterior-inferior parietal bone is stable. This likely r
epresents a venous lake.



The visualized paranasal sinuses appear well-aerated. There is a tiny amount of fluid in the right ma
stoid air cells.



IMPRESSION: No evidence of acute intracranial process or mass.



Reported By: Ambrose Ritter 

Electronically Signed:  9/4/2020 3:43 PM

## 2020-09-04 NOTE — MRI
Magnetic resonance angiogram MRA head noncontrast:



DATE:

9/4/2020



HISTORY:

84-year-old female with ICD-10: "I 63.9, cerebrovascular accident (CVA), unspecified mechanism"



TECHNIQUE:

3-D time-of-flight MRA in multiple axial slabs. Source images and 3-D MIP reconstructions evaluated.



FINDINGS:

The anterior and posterior circulation major central arteries of [place] demonstrate no high
-grade focal short segment acquired stenosis, with no evidence of occlusion. There is no evidence

of aneurysm larger than 3 mm.



Source images demonstrate an old infarction of right paramedian midbrain including medial portion of 
right cerebral peduncle.

MRI performed today demonstrates several small old cortical infarctions at the right upper frontal re
gion, including precentral gyrus (motor cortex) and in a right upper parietal gyrus. These were

also present on previous MRI of 2/13/2018.



IMPRESSION:



1. No major pathology of [place] identified.

2. Small old infarctions, including right midbrain and right upper cerebral cortex, including motor c
ortex.



Reported By: Eduardo Estrada 

Electronically Signed:  9/4/2020 3:58 PM

## 2023-08-07 NOTE — HP
REASON FOR ADMISSION:  Pneumonia.

 

HISTORY OF PRESENTING ILLNESS:  Please note majority of this history is 
obtained by talking to ER physician, patient's daughter, Ms. Hill and patient'
s close friend at bedside as patient is very lethargic at present.  The patient 
has been complaining of coughing spells from last 2 nights.  She has not been 
able to sleep at night.  She lives at [facility] and has 24/7 
caregivers.  She has been using a walker with assistance to ambulate.  Around 4:
30 this morning, the patient developed fever when daughter went to visit her.  
She was not looking right and was very lethargic and was coughing as well.  She 
finally brought her to emergency room here.

 

PAST MEDICAL AND SURGICAL HISTORY:  Hypertension, Parkinson's disease, dementia
, prior history of CVA with left hemiparesis, recent fracture of collar bone on 
the left side, history of a PFO closure, left hip surgery, hysterectomy, 
history of lupus in remission.

 

CURRENT MEDICATIONS:  The patient is on aspirin 81 mg daily, carbidopa/levodopa 
50/200 mg p.o. 3 times daily, Pristiq 100 mg p.o. daily, diazepam 5-10 mg p.o. 
at bedtime, Colace 100 mg p.o. at bedtime, Aricept 10 mg daily, Lasix 20 mg 
p.r.n., hydrochlorothiazide 12.5 mg p.o. daily, iron 65 mg p.o. at bedtime, 
probiotic 1 capsule twice daily, lisinopril 2.5 mg p.o. daily, primidone 50 mg 
p.o. at bedtime.

 

ALLERGIES:  LEVOFLOXACIN.

 

PERSONAL HISTORY:  Does not abuse alcohol or drugs.  No history of smoking.  
The patient is a resident of [facility] and has 24/7 caregivers.  She 
has been told to weightbear from last 10 days due to her left collar bone 
fracture and has been slowly progressing.

 

FAMILY HISTORY:  Has 2 sisters, one is in a nursing home and the other sister 
is apparently healthy and is living in [place].

 

CODE STATUS:  DNR.  This was discussed with Ms. Hill, the patient's daughter 
and is also the power of .

 

REVIEW OF SYSTEMS:  Cannot be obtained as patient is not oriented and is very 
lethargic.

 

PHYSICAL EXAMINATION:

GENERAL:  The patient is an 82-year-old female, who is currently not in any 
acute distress.

VITAL SIGNS:  Blood pressure 122/75, pulse 75 per minute, respiratory rate 14 
per minute, temperature 99.7 degrees Fahrenheit, saturating 97% on room air.

NECK:  Supple, no elevated JVD.

HEENT:  Eyes:  Extraocular muscles intact.  Pupils are reacting to light.  Oral 
cavity mucous membranes are moist.  No exudates or congestion.

CARDIOVASCULAR SYSTEM:  S1, S2 heard.  Regular rhythm.

RESPIRATORY SYSTEM:  Air entry 1+ bilateral.  Scattered rhonchi plus bilateral.

ABDOMEN:  Soft, bowel sounds heard.  No tenderness, rigidity or guarding.

EXTREMITIES:  No peripheral edema or calf tenderness.

VASCULAR SYSTEM:  Peripheral pulses 1+ bilateral, no ischemic ulcerations or 
gangrene.

CENTRAL NERVOUS SYSTEM:  The patient has chronic left hemiparesis with strength 
of around 3/5 in both left upper and lower extremity.  Right has normal 
strength.

PSYCHIATRIC SYSTEM:  Cannot be accurately assessed as the patient is very 
lethargic.

 

LABORATORY DATA AND IMAGING:  Chest x-ray done shows right lower lobe pneumonia
, influenza A and B antigens are negative.  Potassium 3.3, sodium 133, BUN 10, 
creatinine 0.5, albumin is 3.8.  BNP is 94.  Blood gas done shows a pH of 7.47, 
PCO2 of 39, pO2 of _____.  Bicarbonate on the blood gas is 27.  White count of 9
, H&H 13 and 38, platelet count is 212.  MCV is 103.  EKG done shows normal 
sinus rhythm at 70 beats per minute.  There is low voltage seen.

 

CLINICAL IMPRESSION AND PLAN:  The patient will be admitted to medical floor 
for right lung pneumonia.  The patient will be on Zithromax and ceftriaxone.  
She will also be on DuoNebs q.6 hourly.  We will continue aspirin, Colace, 
Aricept, ferrous sulfate, lactobacillus, lisinopril, primidone, Effexor as 
before.  She will also be on normal saline at 60 mL per hour.  We will add 
Mucinex and Tessalon Perles to help bring her sputum up easily.  I have given 
complete updates to patient's daughter, Ms. Hill over the phone.  We will 
continue to closely monitor her for any hemodynamic compromise.  We will also 
obtain a speech therapist consultation to see if she has any issues with 
aspiration.

 

FEDERICO Quinolones Counseling:  I discussed with the patient the risks of fluoroquinolones including but not limited to GI upset, allergic reaction, drug rash, diarrhea, dizziness, photosensitivity, yeast infections, liver function test abnormalities, tendonitis/tendon rupture.